# Patient Record
Sex: FEMALE | Race: WHITE
[De-identification: names, ages, dates, MRNs, and addresses within clinical notes are randomized per-mention and may not be internally consistent; named-entity substitution may affect disease eponyms.]

---

## 2019-03-04 ENCOUNTER — HOSPITAL ENCOUNTER (EMERGENCY)
Dept: HOSPITAL 62 - ER | Age: 39
Discharge: HOME | End: 2019-03-04
Payer: COMMERCIAL

## 2019-03-04 VITALS — DIASTOLIC BLOOD PRESSURE: 75 MMHG | SYSTOLIC BLOOD PRESSURE: 109 MMHG

## 2019-03-04 DIAGNOSIS — E11.9: ICD-10-CM

## 2019-03-04 DIAGNOSIS — Z90.49: ICD-10-CM

## 2019-03-04 DIAGNOSIS — F17.200: ICD-10-CM

## 2019-03-04 DIAGNOSIS — M54.5: Primary | ICD-10-CM

## 2019-03-04 PROCEDURE — 99283 EMERGENCY DEPT VISIT LOW MDM: CPT

## 2019-03-04 NOTE — ER DOCUMENT REPORT
HPI





- HPI


Patient complains to provider of: Back pain


Time Seen by Provider: 03/04/19 09:09


Onset: Yesterday


Onset/Duration: Sudden


Quality of pain: Achy


Pain Level: 4


Context: 





Patient states she was running up stairs yesterday and had a sudden onset of low

back pain.  Patient denies any urinary retention or incontinence.  Patient 

denies any fever.  Patient denies any previous history of back pain


Associated Symptoms: Other - Low back pain.  denies: Fever


Exacerbated by: Movement


Relieved by: Denies


Similar symptoms previously: No


Recently seen / treated by doctor: No





- ROS


ROS below otherwise negative: Yes


Systems Reviewed and Negative: Yes All other systems reviewed and negative





- CONSTITUTIONAL


Constitutional: DENIES: Fever, Chills





- EENT


EENT: DENIES: Sore Throat





- NEURO


Neurology: DENIES: Weakness





- CARDIOVASCULAR


Cardiovascular: DENIES: Chest pain





- GASTROINTESTINAL


Gastrointestinal: DENIES: Nausea





- URINARY


Urinary: DENIES: Dysuria, Urgency, Frequency





- REPRODUCTIVE


Reproductive: DENIES: Pregnant:





- MUSCULOSKELETAL


Musculoskeletal: REPORTS: Back Pain





- DERM


Skin Color: Normal


Skin Problems: None





Past Medical History





- General


Information source: Patient





- Social History


Smoking Status: Current Every Day Smoker


Chew tobacco use (# tins/day): No


Smoking Education Provided: Yes


Frequency of alcohol use: None


Drug Abuse: None


Family History: Reviewed & Not Pertinent


Patient has suicidal ideation: No


Patient has homicidal ideation: No


Neurological Medical History: Reports: Other - Diabetic neuropathy


Endocrine Medical History: Reports: Hx Diabetes Mellitus Type 2


Renal/ Medical History: Denies: Hx Peritoneal Dialysis


Past Surgical History: Reports: Hx Cholecystectomy





Vertical Provider Document





- CONSTITUTIONAL


Agree With Documented VS: Yes


Exam Limitations: No Limitations


General Appearance: WD/WN, No Apparent Distress


Notes: 





PHYSICAL EXAMINATION:





GENERAL: Well-appearing, well-nourished and in no acute distress.





HEAD: Atraumatic, normocephalic.





EYES: sclera clear, anicteric, conjunctiva are normal.





ENT: nares patent, Moist mucous membranes.





NECK: Normal range of motion, supple no lymphadenopathy





LUNGS: respirations unlabored





HEART: Regular rate and rhythm without murmurs 





EXTREMITIES: Normal range of motion, no pitting or edema.  No cyanosis. Gait 

normal, pt ambulates without difficulty





BACK: Right lumbar paraspinal tenderness, no midline tenderness, no deformities 

or step-offs.  No CVA tenderness. 





NEUROLOGICAL: Cranial nerves grossly intact.  Normal speech, normal gait.  No 

saddle anesthesia. 





PSYCH: Normal mood, normal affect.





SKIN: Warm, Dry, normal turgor, no rashes or lesions noted.











- INFECTION CONTROL


TRAVEL OUTSIDE OF THE [place] IN LAST 30 DAYS: No





Course





- Re-evaluation


Re-evalutation: 





03/04/19 09:20


The patient presents with low back pain without signs of spinal cord 

compression, cauda equina syndrome, infection, aneurysm, or other serious 

etiology.  The patient is neurologically intact.  Given the extremely risk of 

these diagnoses further testing and evaluation for these possibilities does not 

appear to be indicated at this time.  Patient has been instructed to return if 

the symptoms worsen or change in any way.





- Vital Signs


Vital signs: 


                                        











Temp Pulse Resp BP Pulse Ox


 


 98.8 F   95   16   109/75   98 


 


 03/04/19 09:02  03/04/19 09:02  03/04/19 09:02  03/04/19 09:02  03/04/19 09:02














Discharge





- Discharge


Clinical Impression: 


Low back pain


Qualifiers:


 Chronicity: acute Back pain laterality: right Sciatica presence: unspecified 

whether sciatica present Qualified Code(s): M54.5 - Low back pain





Condition: Stable


Disposition: HOME, SELF-CARE


Instructions:  Family Physicians / Practices, Ice Packs ([facility]), Low Back Pain 

([facility]), Oral Narcotic Medication ([facility])


Additional Instructions: 


Return immediately for any new or worsening symptoms





Followup with your primary care provider, call tomorrow to make a followup 

appointment





Follow-up with orthopedics for any persistent pain or problems


Prescriptions: 


Hydrocodone/Acetaminophen [Norco 5-325 mg Tablet] 1 tab PO Q6 PRN #12 tablet


 PRN Reason: 


Metaxalone [Skelaxin 800 mg Tablet] 800 mg PO ASDIR PRN #15 tablet


 PRN Reason: 


Forms:  Smoking Cessation Education, Return to Work


Referrals: 


[facility] [Provider Group] - Follow up as needed